# Patient Record
Sex: MALE | Race: WHITE | Employment: UNEMPLOYED | URBAN - METROPOLITAN AREA
[De-identification: names, ages, dates, MRNs, and addresses within clinical notes are randomized per-mention and may not be internally consistent; named-entity substitution may affect disease eponyms.]

---

## 2017-01-03 ENCOUNTER — ANESTHESIA EVENT (OUTPATIENT)
Dept: SURGERY | Facility: CLINIC | Age: 67
End: 2017-01-03
Payer: MEDICARE

## 2017-01-03 ENCOUNTER — APPOINTMENT (OUTPATIENT)
Dept: GENERAL RADIOLOGY | Facility: CLINIC | Age: 67
End: 2017-01-03
Attending: UROLOGY
Payer: MEDICARE

## 2017-01-03 ENCOUNTER — ANESTHESIA (OUTPATIENT)
Dept: SURGERY | Facility: CLINIC | Age: 67
End: 2017-01-03
Payer: MEDICARE

## 2017-01-03 PROCEDURE — 25800025 ZZH RX 258: Performed by: NURSE ANESTHETIST, CERTIFIED REGISTERED

## 2017-01-03 PROCEDURE — 25000125 ZZHC RX 250: Performed by: NURSE ANESTHETIST, CERTIFIED REGISTERED

## 2017-01-03 PROCEDURE — 25000125 ZZHC RX 250: Performed by: UROLOGY

## 2017-01-03 PROCEDURE — 74010 XR KUB: CPT | Mod: 52

## 2017-01-03 RX ORDER — PROPOFOL 10 MG/ML
INJECTION, EMULSION INTRAVENOUS CONTINUOUS PRN
Status: DISCONTINUED | OUTPATIENT
Start: 2017-01-03 | End: 2017-01-03

## 2017-01-03 RX ORDER — LIDOCAINE HYDROCHLORIDE 20 MG/ML
INJECTION, SOLUTION INFILTRATION; PERINEURAL PRN
Status: DISCONTINUED | OUTPATIENT
Start: 2017-01-03 | End: 2017-01-03

## 2017-01-03 RX ORDER — FENTANYL CITRATE 50 UG/ML
INJECTION, SOLUTION INTRAMUSCULAR; INTRAVENOUS PRN
Status: DISCONTINUED | OUTPATIENT
Start: 2017-01-03 | End: 2017-01-03

## 2017-01-03 RX ORDER — DEXAMETHASONE SODIUM PHOSPHATE 4 MG/ML
INJECTION, SOLUTION INTRA-ARTICULAR; INTRALESIONAL; INTRAMUSCULAR; INTRAVENOUS; SOFT TISSUE PRN
Status: DISCONTINUED | OUTPATIENT
Start: 2017-01-03 | End: 2017-01-03

## 2017-01-03 RX ORDER — PROPOFOL 10 MG/ML
INJECTION, EMULSION INTRAVENOUS PRN
Status: DISCONTINUED | OUTPATIENT
Start: 2017-01-03 | End: 2017-01-03

## 2017-01-03 RX ORDER — SODIUM CHLORIDE, SODIUM LACTATE, POTASSIUM CHLORIDE, CALCIUM CHLORIDE 600; 310; 30; 20 MG/100ML; MG/100ML; MG/100ML; MG/100ML
INJECTION, SOLUTION INTRAVENOUS CONTINUOUS PRN
Status: DISCONTINUED | OUTPATIENT
Start: 2017-01-03 | End: 2017-01-03

## 2017-01-03 RX ORDER — ONDANSETRON 2 MG/ML
INJECTION INTRAMUSCULAR; INTRAVENOUS PRN
Status: DISCONTINUED | OUTPATIENT
Start: 2017-01-03 | End: 2017-01-03

## 2017-01-03 RX ADMIN — Medication 3 G: at 12:59

## 2017-01-03 RX ADMIN — PROPOFOL 200 MG: 10 INJECTION, EMULSION INTRAVENOUS at 12:55

## 2017-01-03 RX ADMIN — MIDAZOLAM HYDROCHLORIDE 2 MG: 1 INJECTION, SOLUTION INTRAMUSCULAR; INTRAVENOUS at 12:50

## 2017-01-03 RX ADMIN — ONDANSETRON 4 MG: 2 INJECTION INTRAMUSCULAR; INTRAVENOUS at 12:59

## 2017-01-03 RX ADMIN — FENTANYL CITRATE 25 MCG: 50 INJECTION, SOLUTION INTRAMUSCULAR; INTRAVENOUS at 14:00

## 2017-01-03 RX ADMIN — DEXAMETHASONE SODIUM PHOSPHATE 4 MG: 4 INJECTION, SOLUTION INTRAMUSCULAR; INTRAVENOUS at 12:59

## 2017-01-03 RX ADMIN — SODIUM CHLORIDE, POTASSIUM CHLORIDE, SODIUM LACTATE AND CALCIUM CHLORIDE: 600; 310; 30; 20 INJECTION, SOLUTION INTRAVENOUS at 14:14

## 2017-01-03 RX ADMIN — LIDOCAINE HYDROCHLORIDE 60 MG: 20 INJECTION, SOLUTION INFILTRATION; PERINEURAL at 12:55

## 2017-01-03 RX ADMIN — FENTANYL CITRATE 25 MCG: 50 INJECTION, SOLUTION INTRAMUSCULAR; INTRAVENOUS at 13:55

## 2017-01-03 RX ADMIN — SODIUM CHLORIDE, POTASSIUM CHLORIDE, SODIUM LACTATE AND CALCIUM CHLORIDE: 600; 310; 30; 20 INJECTION, SOLUTION INTRAVENOUS at 12:50

## 2017-01-03 RX ADMIN — PHENYLEPHRINE HYDROCHLORIDE 100 MCG: 10 INJECTION, SOLUTION INTRAMUSCULAR; INTRAVENOUS; SUBCUTANEOUS at 13:16

## 2017-01-03 RX ADMIN — FENTANYL CITRATE 50 MCG: 50 INJECTION, SOLUTION INTRAMUSCULAR; INTRAVENOUS at 12:50

## 2017-01-03 RX ADMIN — PROPOFOL 200 MCG/KG/MIN: 10 INJECTION, EMULSION INTRAVENOUS at 12:55

## 2017-01-03 ASSESSMENT — ENCOUNTER SYMPTOMS: SEIZURES: 1

## 2017-01-03 NOTE — ANESTHESIA CARE TRANSFER NOTE
Patient: Jimmy Walter    COMBINED CYSTOSCOPY, URETEROSCOPY, LASER HOLMIUM LITHOTRIPSY URETER(S), INSERT STENT (Right Urethra)  COMBINED CYSTOSCOPY, RETROGRADES, EXCHANGE STENT URETER(S) (Right Urethra)  EXTRACORPOREAL SHOCK WAVE LITHOTRIPSY (ESWL) (Right Kidney)  Additional InformationProcedure(s):  ;CYSTOSCOPY RIGHT URETEROSCOPY, HOLMIUM LASER, RIGHT EXTRACORPOREAL SHOCK WAVE LITHOTRIPSY (ESWL) ,RIGHT STENT EXCHANGE - Wound Class: II-Clean Contaminated   - Wound Class: I-Clean   - Wound Class: II-Clean Contaminated    Diagnosis: RIGHT STONE KIDNEY AND URETERAL  Diagnosis Additional Information: No value filed.    Anesthesia Type:   General, LMA     Note:  Airway :Face Mask  Patient transferred to:PACU  Comments: Spontaneous respirations, airway patent, LMA removed atraumatically. Oxygen via face mask at 8   LPM to PACU, connected to wall O2 in PACU. All monitors and alarms on and functioning. Report given to PACU RN and questions answered.       Vitals: (Last set prior to Anesthesia Care Transfer)              Electronically Signed By: MELE Lopes CRNA  January 3, 2017  2:53 PM

## 2017-01-03 NOTE — ANESTHESIA PREPROCEDURE EVALUATION
Anesthesia Evaluation     . Pt has had prior anesthetic. Type: General    No history of anesthetic complications     ROS/MED HX    ENT/Pulmonary:     (+)sleep apnea, uses CPAP , . .    Neurologic:     (+)seizures     Cardiovascular:  - neg cardiovascular ROS       METS/Exercise Tolerance:     Hematologic:         Musculoskeletal:         GI/Hepatic:  - neg GI/hepatic ROS       Renal/Genitourinary:     (+) Nephrolithiasis ,       Endo:     (+) Obesity, .      Psychiatric:         Infectious Disease:         Malignancy:   (+) Malignancy           Other:               Physical Exam  Normal systems: cardiovascular, pulmonary and dental    Airway   Mallampati: II  TM distance: >3 FB  Neck ROM: full    Dental     Cardiovascular   Rhythm and rate: regular and normal      Pulmonary    breath sounds clear to auscultation                    Anesthesia Plan      History & Physical Review  History and physical reviewed and following examination; no interval change.    ASA Status:  3 .    NPO Status:  > 8 hours    Plan for General and LMA with Propofol induction. Maintenance will be TIVA.    PONV prophylaxis:  Ondansetron (or other 5HT-3) and Dexamethasone or Solumedrol       Postoperative Care  Postoperative pain management:  IV analgesics and Oral pain medications.      Consents  Anesthetic plan, risks, benefits and alternatives discussed with:  Patient..                          .

## 2017-01-03 NOTE — ANESTHESIA POSTPROCEDURE EVALUATION
Patient: Jimmy Walter    COMBINED CYSTOSCOPY, URETEROSCOPY, LASER HOLMIUM LITHOTRIPSY URETER(S), INSERT STENT (Right Urethra)  COMBINED CYSTOSCOPY, RETROGRADES, EXCHANGE STENT URETER(S) (Right Urethra)  EXTRACORPOREAL SHOCK WAVE LITHOTRIPSY (ESWL) (Right Kidney)  Additional InformationProcedure(s):  ;CYSTOSCOPY RIGHT URETEROSCOPY, HOLMIUM LASER, RIGHT EXTRACORPOREAL SHOCK WAVE LITHOTRIPSY (ESWL) ,RIGHT STENT EXCHANGE - Wound Class: II-Clean Contaminated   - Wound Class: I-Clean   - Wound Class: II-Clean Contaminated    Diagnosis:RIGHT STONE KIDNEY AND URETERAL  Diagnosis Additional Information: No value filed.    Anesthesia Type:  General, LMA    Note:  Anesthesia Post Evaluation    Patient location during evaluation: PACU  Patient participation: Able to fully participate in evaluation  Level of consciousness: awake  Pain management: adequate  Airway patency: patent  Cardiovascular status: acceptable  Respiratory status: acceptable  Hydration status: acceptable  PONV: none     Anesthetic complications: None          Last vitals:  Filed Vitals:    01/03/17 1201 01/03/17 1452   BP: 129/73 144/84   Pulse:  87   Temp: 36.6  C (97.9  F) 36.9  C (98.4  F)   Resp: 20 12   SpO2: 96% 95%       Electronically Signed By: Milan Jolly MD  January 3, 2017  3:01 PM

## 2017-01-09 ASSESSMENT — ENCOUNTER SYMPTOMS
JAUNDICE: 0
RECTAL PAIN: 0
ABDOMINAL PAIN: 0
NAUSEA: 0
HEMATURIA: 0
BLOOD IN STOOL: 0
DIFFICULTY URINATING: 1
RECTAL BLEEDING: 0
FLANK PAIN: 1
BOWEL INCONTINENCE: 0
DIARRHEA: 0
CONSTIPATION: 0
VOMITING: 0
DYSURIA: 1
BLOATING: 0
HEARTBURN: 0

## 2017-01-11 DIAGNOSIS — N20.0 KIDNEY STONE: Primary | ICD-10-CM

## 2017-01-12 ENCOUNTER — OFFICE VISIT (OUTPATIENT)
Dept: UROLOGY | Facility: CLINIC | Age: 67
End: 2017-01-12
Payer: MEDICARE

## 2017-01-12 ENCOUNTER — HOSPITAL ENCOUNTER (OUTPATIENT)
Dept: GENERAL RADIOLOGY | Facility: CLINIC | Age: 67
Discharge: HOME | End: 2017-01-12
Attending: UROLOGY | Admitting: UROLOGY
Payer: MEDICARE

## 2017-01-12 VITALS — BODY MASS INDEX: 37.8 KG/M2 | HEIGHT: 71 IN | WEIGHT: 270 LBS

## 2017-01-12 DIAGNOSIS — N40.0 ENLARGED PROSTATE: Primary | ICD-10-CM

## 2017-01-12 DIAGNOSIS — N20.0 KIDNEY STONE: ICD-10-CM

## 2017-01-12 DIAGNOSIS — K80.50 STONES COMMON DUCT: ICD-10-CM

## 2017-01-12 LAB
ALBUMIN UR-MCNC: 30 MG/DL
APPEARANCE UR: CLEAR
BILIRUB UR QL STRIP: NEGATIVE
COLOR UR AUTO: YELLOW
GLUCOSE UR STRIP-MCNC: NEGATIVE MG/DL
HGB UR QL STRIP: ABNORMAL
KETONES UR STRIP-MCNC: NEGATIVE MG/DL
LEUKOCYTE ESTERASE UR QL STRIP: ABNORMAL
NITRATE UR QL: NEGATIVE
PH UR STRIP: 5.5 PH (ref 5–7)
SP GR UR STRIP: 1.02 (ref 1–1.03)
URN SPEC COLLECT METH UR: ABNORMAL
UROBILINOGEN UR STRIP-ACNC: 0.2 EU/DL (ref 0.2–1)

## 2017-01-12 PROCEDURE — 99024 POSTOP FOLLOW-UP VISIT: CPT | Performed by: UROLOGY

## 2017-01-12 PROCEDURE — 52310 CYSTOSCOPY AND TREATMENT: CPT | Performed by: UROLOGY

## 2017-01-12 PROCEDURE — 74010 XR KUB: CPT | Mod: 52

## 2017-01-12 PROCEDURE — 81003 URINALYSIS AUTO W/O SCOPE: CPT | Performed by: UROLOGY

## 2017-01-12 RX ORDER — CIPROFLOXACIN 500 MG/1
500 TABLET, FILM COATED ORAL ONCE
Qty: 1 TABLET | Refills: 0 | Status: SHIPPED | OUTPATIENT
Start: 2017-01-12 | End: 2017-01-12

## 2017-01-12 ASSESSMENT — PAIN SCALES - GENERAL: PAINLEVEL: NO PAIN (0)

## 2017-01-12 NOTE — PROGRESS NOTES
Office Visit Note  [facility]  [phone]    UROLOGIC DIAGNOSES:   Right kidney stone and right ureteral stone, enlarged prostate    CURRENT INTERVENTIONS:   S/P ESWL of kidney stone and ureteroscopy for ureteral stone    HISTORY:   Jimmy returns to clinic today for postoperative visit.  He reports feeling well since his procedure. His stones were composed of calcium oxalate monohydrate. During his procedures he was also found to have a very enlarged and obstructing median lobe of the prostate. He reports no baseline urinary complaints prior to having problems with his stones.  He is here today for stent removal.      PAST MEDICAL HISTORY:   Past Medical History   Diagnosis Date     Allergy      Seizures (H)      Cancer (H)      Sleep apnea      CPAP       PAST SURGICAL HISTORY:   Past Surgical History   Procedure Laterality Date     Mri craniotomy with optical tracking system  7/10/2013     Procedure: MRI CRANIOTOMY WITH OPTICAL TRACKING SYSTEM;  MRI 3 Stealth Assisted Right Craniotomy And Tumor Resection ;  Surgeon: Danilo Antonio MD;  [facility]     Orthopedic surgery       toe surgery     Orthopedic surgery Left      ORIF Fibula FX     Combined cystoscopy, retrogrades, ureteroscopy, insert stent Right 12/17/2016     Procedure: COMBINED CYSTOSCOPY, RETROGRADES, URETEROSCOPY, INSERT STENT;  Surgeon: Milan Leary MD;  Location:  OR     Laser holmium lithotripsy ureter(s), insert stent, combined Right 1/3/2017     Procedure: COMBINED CYSTOSCOPY, URETEROSCOPY, LASER HOLMIUM LITHOTRIPSY URETER(S), INSERT STENT;  Surgeon: Milan Leary MD;  Location:  OR     Extracorporeal shock wave lithotripsy (eswl) Right 1/3/2017     Procedure: EXTRACORPOREAL SHOCK WAVE LITHOTRIPSY (ESWL);  Surgeon: Milan Leary MD;  Location:  OR     Combined cystoscopy, retrogrades, exchange stent ureter(s) Right 1/3/2017     Procedure: COMBINED CYSTOSCOPY, RETROGRADES, EXCHANGE  STENT URETER(S);  Surgeon: Milan Leary MD;  Location:  OR       FAMILY HISTORY: No family history on file.    SOCIAL HISTORY:   Social History   Substance Use Topics     Smoking status: Never Smoker      Smokeless tobacco: Not on file     Alcohol Use: Yes      Comment: one per week at most       Current Outpatient Prescriptions   Medication     oxyCODONE (ROXICODONE) 5 MG IR tablet     tolterodine (DETROL LA) 4 MG 24 hr capsule     ondansetron (ZOFRAN ODT) 4 MG ODT tab     loratadine (CLARITIN) 10 MG tablet     traZODone (DESYREL) 50 MG tablet     levETIRAcetam (KEPPRA) 750 MG tablet     MELATONIN PO     magnesium 250 MG tablet     DiphenhydrAMINE HCl (BENADRYL ALLERGY PO)     Ascorbic Acid (VITAMIN C ER PO)     omeprazole 20 MG tablet     fluticasone (FLONASE) 50 MCG/ACT nasal spray     multivitamin, therapeutic with minerals (THERA-VIT-M) TABS     Omega-3 Fatty Acids (OMEGA-3 FISH OIL PO)     calcium carbonate (OS-GABRIEL 500 MG [place]. [place]) 500 MG tablet     No current facility-administered medications for this visit.         PHYSICAL EXAM:    There were no vitals taken for this visit.    HEENT: Normocephalic and atraumatic   Cardiac: Not done  Back/Flank: Not done  CNS/PNS: Not done  Respiratory: Normal non-labored breathing  Abdomen: Soft nontender and nondistended  Peripheral Vascular: Not done  Mental Status: Not done    Penis: Not done  Scrotal Skin: Not done  Testicles: Not done  Epididymis: Not done  Digital Rectal Exam: not repeated    Cystoscopy: I performed flexible cystoscopy todayand removed the stent without difficulty.    Imaging: I reviewed his KUB images from today. Stent in good position.  No stones present.    Urinalysis: UA RESULTS:  Recent Labs   Lab Test  12/14/16   2325   COLOR  Yellow   APPEARANCE  Slightly Cloudy   URINEGLC  Negative   URINEBILI  Negative   URINEKETONE  5*   SG  1.021   UBLD  Moderate*   URINEPH  5.5   PROTEIN  10*   NITRITE  Negative   LEUKEST  Negative   RBCU   121*   WBCU  5*       PSA: he reports having a normal PSA with his endocrinologist    Post Void Residual:     Other labs: None today      IMPRESSION:  Doing well, stent removed, enlarged prostate    PLAN:  We discussed prevention methods for future stones. I will have him come back in 1 year for another KUBto rule outthe formation of any additional stones. In the meantime, I would like to have him come back to clinic  For further evaluation of the prostate.  I would like to give him a month to recover from his stone procedures but then have him come back in 1 month to check a urinalysis and bladder scan.  We will also get his PSA information from his endocrinologist's office.  I will see him back next month.    Total Time: 15 minutes                                      Total in Consultation: 10 minutes      Milan Leary M.D.

## 2017-01-12 NOTE — Clinical Note
1/12/2017       RE: Jimmy Walter  [address] [facility] [address]     Dear Colleague,    Thank you for referring your patient, Jimmy Walter, to the [facility] [place] [facility]. Please see a copy of my visit note below.    Office Visit Note  [facility]  [phone]    UROLOGIC DIAGNOSES:   Right kidney stone and right ureteral stone, enlarged prostate    CURRENT INTERVENTIONS:   S/P ESWL of kidney stone and ureteroscopy for ureteral stone    HISTORY:   Jimmy returns to clinic today for postoperative visit.  He reports feeling well since his procedure. His stones were composed of calcium oxalate monohydrate. During his procedures he was also found to have a very enlarged and obstructing median lobe of the prostate. He reports no baseline urinary complaints prior to having problems with his stones.  He is here today for stent removal.      PAST MEDICAL HISTORY:   Past Medical History   Diagnosis Date     Allergy      Seizures (H)      Cancer (H)      Sleep apnea      CPAP       PAST SURGICAL HISTORY:   Past Surgical History   Procedure Laterality Date     Mri craniotomy with optical tracking system  7/10/2013     Procedure: MRI CRANIOTOMY WITH OPTICAL TRACKING SYSTEM;  MRI 3 Stealth Assisted Right Craniotomy And Tumor Resection ;  Surgeon: Danilo Antonio MD;  [facility]     Orthopedic surgery       toe surgery     Orthopedic surgery Left      ORIF Fibula FX     Combined cystoscopy, retrogrades, ureteroscopy, insert stent Right 12/17/2016     Procedure: COMBINED CYSTOSCOPY, RETROGRADES, URETEROSCOPY, INSERT STENT;  Surgeon: Milan Leary MD;  Location:  OR     Laser holmium lithotripsy ureter(s), insert stent, combined Right 1/3/2017     Procedure: COMBINED CYSTOSCOPY, URETEROSCOPY, LASER HOLMIUM LITHOTRIPSY URETER(S), INSERT STENT;  Surgeon: Milan Leary MD;  Location:   OR     Extracorporeal shock wave lithotripsy (eswl) Right 1/3/2017     Procedure: EXTRACORPOREAL SHOCK WAVE LITHOTRIPSY (ESWL);  Surgeon: Milan Leary MD;  Location: [facility] OR     Combined cystoscopy, retrogrades, exchange stent ureter(s) Right 1/3/2017     Procedure: COMBINED CYSTOSCOPY, RETROGRADES, EXCHANGE STENT URETER(S);  Surgeon: Milan Leary MD;  Location:  OR       FAMILY HISTORY: No family history on file.    SOCIAL HISTORY:   Social History   Substance Use Topics     Smoking status: Never Smoker      Smokeless tobacco: Not on file     Alcohol Use: Yes      Comment: one per week at most       Current Outpatient Prescriptions   Medication     oxyCODONE (ROXICODONE) 5 MG IR tablet     tolterodine (DETROL LA) 4 MG 24 hr capsule     ondansetron (ZOFRAN ODT) 4 MG ODT tab     loratadine (CLARITIN) 10 MG tablet     traZODone (DESYREL) 50 MG tablet     levETIRAcetam (KEPPRA) 750 MG tablet     MELATONIN PO     magnesium 250 MG tablet     DiphenhydrAMINE HCl (BENADRYL ALLERGY PO)     Ascorbic Acid (VITAMIN C ER PO)     omeprazole 20 MG tablet     fluticasone (FLONASE) 50 MCG/ACT nasal spray     multivitamin, therapeutic with minerals (THERA-VIT-M) TABS     Omega-3 Fatty Acids (OMEGA-3 FISH OIL PO)     calcium carbonate (OS-GABRIEL 500 MG [place]. [place]) 500 MG tablet     No current facility-administered medications for this visit.         PHYSICAL EXAM:    There were no vitals taken for this visit.    HEENT: Normocephalic and atraumatic   Cardiac: Not done  Back/Flank: Not done  CNS/PNS: Not done  Respiratory: Normal non-labored breathing  Abdomen: Soft nontender and nondistended  Peripheral Vascular: Not done  Mental Status: Not done    Penis: Not done  Scrotal Skin: Not done  Testicles: Not done  Epididymis: Not done  Digital Rectal Exam: not repeated    Cystoscopy: I performed flexible cystoscopy todayand removed the stent without difficulty.    Imaging: I reviewed his KUB images from today. Stent in  good position.  No stones present.    Urinalysis: UA RESULTS:  Recent Labs   Lab Test  12/14/16   2325   COLOR  Yellow   APPEARANCE  Slightly Cloudy   URINEGLC  Negative   URINEBILI  Negative   URINEKETONE  5*   SG  1.021   UBLD  Moderate*   URINEPH  5.5   PROTEIN  10*   NITRITE  Negative   LEUKEST  Negative   RBCU  121*   WBCU  5*       PSA: he reports having a normal PSA with his endocrinologist    Post Void Residual:     Other labs: None today      IMPRESSION:  Doing well, stent removed, enlarged prostate    PLAN:  We discussed prevention methods for future stones. I will have him come back in 1 year for another KUBto rule outthe formation of any additional stones. In the meantime, I would like to have him come back to clinic  For further evaluation of the prostate.  I would like to give him a month to recover from his stone procedures but then have him come back in 1 month to check a urinalysis and bladder scan.  We will also get his PSA information from his endocrinologist's office.  I will see him back next month.    Total Time: 15 minutes                                      Total in Consultation: 10 minutes      Milan Leary M.D.

## 2017-01-12 NOTE — MR AVS SNAPSHOT
"              After Visit Summary   1/12/2017    Jimmy Walter    MRN: [id number]           Patient Information     Date Of Birth          1950        Visit Information        Provider Department      1/12/2017 10:00 AM Milan Leary MD; [facility] [place]        Today's Diagnoses     Kidney stone           Care Instructions         AFTER YOUR CYSTOSCOPY         You have just completed a cystoscopy, or \"cysto\", which allowed your physician to learn more about your bladder (or to remove a stent placed after surgery). We suggest that you continue to avoid caffeine, fruit juice, and alcohol for the next 24 hours, however, you are encouraged to return to your normal activities.       A few things that are considered normal after your cystoscopy:    * small amount of bleeding (or spotting) that clears within the next 24 hours    * slight burning sensation with urination    * sensation to of needing to avoid more frequently    * the feeling of \"air\" in your urine    * mild discomfort that is relieved with Tylonol        Please contact our office promptly if you:    * develop a fever above 101 degrees    * are unable to urinate    * develop bright red blood that does not stop    * severe pain or swelling        And of course, please contact our office with any concerns or questions [phone]            Follow-ups after your visit        Follow-up notes from your care team     Return in about 1 month (around 2/12/2017) for UA and bladder scan, Also need PSA info from his endocrinologist.      Your next 10 appointments already scheduled     Jan 16, 2017  9:00 AM   MR BRAIN W/O & W CONTRAST with [id number]   [facility] ([facility])    [id number] [place] [address] [phone]           Take your medicines as usual, unless your doctor tells you not to. Bring a list of your current medicines to " your exam (including vitamins, minerals and over-the-counter drugs).  You will be given intravenous contrast for this exam. To prepare:   The day before your exam, drink extra fluids at least six 8-ounce glasses (unless your doctor tells you to restrict your fluids).   Have a blood test (creatinine test) within 30 days of your exam. Go to your clinic or Diagnostic Imaging Department for this test.  The MRI machine uses a strong magnet. Please wear clothes without metal (snaps, zippers). A sweatsuit works well, or we may give you a hospital gown.  Please remove any body piercings and hair extensions before you arrive. You will also remove watches, jewelry, hairpins, wallets, dentures, partial dental plates and hearing aids. You may wear contact lenses, and you may be able to wear your rings. We have a safe place to keep your personal items, but it is safer to leave them at home.   **IMPORTANT** THE INSTRUCTIONS BELOW ARE ONLY FOR THOSE PATIENTS WHO HAVE BEEN TOLD THEY WILL RECEIVE SEDATION OR GENERAL ANESTHESIA DURING THEIR MRI PROCEDURE:  IF YOU WILL RECEIVE SEDATION (take medicine to help you relax during your exam):   You must get the medicine from your doctor before you arrive. Bring the medicine to the exam. Do not take it at home.   Arrive one hour early. Bring someone who can take you home after the test. Your medicine will make you sleepy. After the exam, you may not drive, take a bus or take a taxi by yourself.   No eating 8 hours before your exam. You may have clear liquids up until 4 hours before your exam. (Clear liquids include water, clear tea, black coffee and fruit juice without pulp.)  IF YOU WILL RECEIVE ANESTHESIA (be asleep for your exam):   Arrive 1 1/2 hours early. Bring someone who can take you home after the test. You may not drive, take a bus or take a taxi by yourself.   No eating 8 hours before your exam. You may have clear liquids up until 4 hours before your exam. (Clear liquids include  water, clear tea, black coffee and fruit juice without pulp.)  Please call the Imaging Department at your exam site with any questions.            Jan 16, 2017  3:00 PM   (Arrive by 2:45 PM)   Return Visit with Danilo Antonio MD   [facility] ([facility] [place])    [address]  2nd Floor  [place] [address]   [phone]            Feb 09, 2017 11:00 AM   Return Visit with Milan Leary MD   [facility] [place] ([facility])    [address]  [address]  [facility] [address] [phone]              Who to contact     If you have questions or need follow up information about today's clinic visit or your schedule please contact [facility] [place] directly at [phone].  Normal or non-critical lab and imaging results will be communicated to you by MyChart, letter or phone within 4 business days after the clinic has received the results. If you do not hear from us within 7 days, please contact the clinic through xiao qu wu youhart or phone. If you have a critical or abnormal lab result, we will notify you by phone as soon as possible.  Submit refill requests through [employer] or call your pharmacy and they will forward the refill request to us. Please allow 3 business days for your refill to be completed.          Additional Information About Your Visit        MyChart Information     [employer] gives you secure access to your electronic health record. If you see a primary care provider, you can also send messages to your care team and make appointments. If you have questions, please call your primary care clinic.  If you do not have a primary care provider, please call [phone] and they will assist you.        Care EveryWhere ID     This is your Care EveryWhere ID. This could be used by other organizations to access your [place] medical records  [id number]        Your Vitals  "Were     Height BMI (Body Mass Index)                1.803 m (5' 11\") 37.67 kg/m2           Blood Pressure from Last 3 Encounters:   01/03/17 151/84   12/17/16 153/98   12/15/16 112/70    Weight from Last 3 Encounters:   01/12/17 122.471 kg (270 lb)   01/03/17 121.02 kg (266 lb 12.8 oz)   12/15/16 122.471 kg (270 lb)              We Performed the Following     UA without Microscopic          Today's Medication Changes          These changes are accurate as of: 1/12/17 10:39 AM.  If you have any questions, ask your nurse or doctor.               Start taking these medicines.        Dose/Directions    ciprofloxacin 500 MG tablet   Commonly known as:  CIPRO   Used for:  Kidney stone   Started by:  Milan Leary MD        Dose:  500 mg   Take 1 tablet (500 mg) by mouth once for 1 dose   Quantity:  1 tablet   Refills:  0            Where to get your medicines      These medications were sent to [facility] [id number] [facility] [address]  [address]  [address], [facility] [address]     Phone:  [phone]    - ciprofloxacin 500 MG tablet             Primary Care Provider Office Phone # Fax #    Kveyn GENEVIEVE Treviño [phone]       [facility] [address] [place] [address]        Thank you!     Thank you for choosing [facility] [place]  for your care. Our goal is always to provide you with excellent care. Hearing back from our patients is one way we can continue to improve our services. Please take a few minutes to complete the written survey that you may receive in the mail after your visit with us. Thank you!             Your Updated Medication List - Protect others around you: Learn how to safely use, store and throw away your medicines at www.disposemymeds.org.          This list is accurate as of: 1/12/17 10:39 AM.  Always use your most recent med list.                   Brand Name Dispense " Instructions for use    BENADRYL ALLERGY PO      Take 25 mg by mouth daily       calcium carbonate 500 MG tablet    OS-GABRIEL 500 mg [place]. Ca     Take 500 mg by mouth daily       ciprofloxacin 500 MG tablet    CIPRO    1 tablet    Take 1 tablet (500 mg) by mouth once for 1 dose       fluticasone 50 MCG/ACT spray    FLONASE     Spray 1 spray into both nostrils daily.       levETIRAcetam 750 MG tablet    KEPPRA    60 tablet    Take 1 tablet (750 mg) by mouth 2 times daily       loratadine 10 MG tablet    CLARITIN     Take 10 mg by mouth daily       magnesium 250 MG tablet      Take 1 tablet by mouth daily       MELATONIN PO      Take 5 mg by mouth At Bedtime       multivitamin, therapeutic with minerals Tabs tablet      Take 1 tablet by mouth daily.       OMEGA-3 FISH OIL PO      Take 1 g by mouth daily       omeprazole 20 MG tablet     30 tablet    Take 1 tablet by mouth daily. Take 30-60 minutes before a meal.       ondansetron 4 MG ODT tab    ZOFRAN ODT    10 tablet    Take 1 tablet (4 mg) by mouth every 8 hours as needed for nausea       oxyCODONE 5 MG IR tablet    ROXICODONE    10 tablet    1-2 tabs po q6hrs prn pain       tolterodine 4 MG 24 hr capsule    DETROL LA    10 capsule    Take 1 capsule (4 mg) by mouth daily as needed       traZODone 50 MG tablet    DESYREL    60 tablet    Take 1 tablet (50 mg) by mouth nightly as needed for sleep       VITAMIN C ER PO      Take 600 mg by mouth daily

## 2017-01-12 NOTE — NURSING NOTE
Chief Complaint   Patient presents with     Cystoscopy     stent out        Bridget Campa LPN 10:05 AM January 12, 2017    Prior to the start of the procedure and with procedural staff participation, I verbally confirmed the patient s identity using two indicators, relevant allergies, that the procedure was appropriate and matched the consent or emergent situation, and that the correct equipment/implants were available. Immediately prior to starting the procedure I conducted the Time Out with the procedural staff and re-confirmed the patient s name, procedure, and site/side. (The Joint Commission universal protocol was followed.)  Yes    Sedation (Moderate or Deep): None    Bridget Campa LPN 10:05 AM January 12, 2017

## 2017-01-12 NOTE — PATIENT INSTRUCTIONS
"     AFTER YOUR CYSTOSCOPY         You have just completed a cystoscopy, or \"cysto\", which allowed your physician to learn more about your bladder (or to remove a stent placed after surgery). We suggest that you continue to avoid caffeine, fruit juice, and alcohol for the next 24 hours, however, you are encouraged to return to your normal activities.       A few things that are considered normal after your cystoscopy:    * small amount of bleeding (or spotting) that clears within the next 24 hours    * slight burning sensation with urination    * sensation to of needing to avoid more frequently    * the feeling of \"air\" in your urine    * mild discomfort that is relieved with Tylonol        Please contact our office promptly if you:    * develop a fever above 101 degrees    * are unable to urinate    * develop bright red blood that does not stop    * severe pain or swelling        And of course, please contact our office with any concerns or questions [phone]      "

## 2017-01-16 ENCOUNTER — OFFICE VISIT (OUTPATIENT)
Dept: NEUROSURGERY | Facility: CLINIC | Age: 67
End: 2017-01-16
Attending: NEUROLOGICAL SURGERY
Payer: MEDICARE

## 2017-01-16 ENCOUNTER — HOSPITAL ENCOUNTER (OUTPATIENT)
Dept: MRI IMAGING | Facility: CLINIC | Age: 67
Discharge: HOME | End: 2017-01-16
Attending: NEUROLOGICAL SURGERY | Admitting: NEUROLOGICAL SURGERY
Payer: MEDICARE

## 2017-01-16 VITALS
BODY MASS INDEX: 37.86 KG/M2 | RESPIRATION RATE: 16 BRPM | DIASTOLIC BLOOD PRESSURE: 86 MMHG | TEMPERATURE: 97.6 F | OXYGEN SATURATION: 96 % | HEART RATE: 68 BPM | WEIGHT: 271.3 LBS | SYSTOLIC BLOOD PRESSURE: 143 MMHG

## 2017-01-16 DIAGNOSIS — C71.9 OLIGODENDROGLIOMA, ANAPLASTIC (H): Primary | ICD-10-CM

## 2017-01-16 DIAGNOSIS — C71.9 OLIGODENDROGLIOMA, ANAPLASTIC (H): ICD-10-CM

## 2017-01-16 PROCEDURE — 99212 OFFICE O/P EST SF 10 MIN: CPT | Mod: ZF

## 2017-01-16 PROCEDURE — 70553 MRI BRAIN STEM W/O & W/DYE: CPT

## 2017-01-16 PROCEDURE — A9585 GADOBUTROL INJECTION: HCPCS | Performed by: NEUROLOGICAL SURGERY

## 2017-01-16 PROCEDURE — 25500045 ZZH RX 255: Performed by: NEUROLOGICAL SURGERY

## 2017-01-16 RX ORDER — GADOBUTROL 604.72 MG/ML
12 INJECTION INTRAVENOUS ONCE
Status: COMPLETED | OUTPATIENT
Start: 2017-01-16 | End: 2017-01-16

## 2017-01-16 RX ADMIN — GADOBUTROL 12 ML: 604.72 INJECTION INTRAVENOUS at 09:24

## 2017-01-16 NOTE — MR AVS SNAPSHOT
After Visit Summary   1/16/2017    Jimmy Walter    MRN: [id number]           Patient Information     Date Of Birth          1950        Visit Information        Provider Department      1/16/2017 3:00 PM Danilo Antonio MD [facility]        Today's Diagnoses     Oligodendroglioma, anaplastic (H)    -  1       Care Instructions    5/12/17 @ 8:45a check in [facility] [address] drink extra fluids night before    5/19/17 @ 10:30 Dr. Lobato 2nd floor [facility]        Follow-ups after your visit        Follow-up notes from your care team     Return in about 4 months (around 5/16/2017) for Imaging.      Your next 10 appointments already scheduled     Feb 09, 2017 11:00 AM   Return Visit with Milan Leary MD   [facility] ([facility])    [address]  [address]  [place] [id number] [phone]            May 12, 2017  9:00 AM   MR BRAIN W/O & W CONTRAST with [id number]   [facility] ([facility])    [address]  [place] [address] [phone]           Take your medicines as usual, unless your doctor tells you not to. Bring a list of your current medicines to your exam (including vitamins, minerals and over-the-counter drugs).  You will be given intravenous contrast for this exam. To prepare:   The day before your exam, drink extra fluids at least six 8-ounce glasses (unless your doctor tells you to restrict your fluids).   Have a blood test (creatinine test) within 30 days of your exam. Go to your clinic or Diagnostic Imaging Department for this test.  The MRI machine uses a strong magnet. Please wear clothes without metal (snaps, zippers). A sweatsuit works well, or we may give you a hospital gown.  Please remove any body piercings and hair extensions before you arrive. You will also remove watches, jewelry, hairpins, wallets, dentures,  partial dental plates and hearing aids. You may wear contact lenses, and you may be able to wear your rings. We have a safe place to keep your personal items, but it is safer to leave them at home.   **IMPORTANT** THE INSTRUCTIONS BELOW ARE ONLY FOR THOSE PATIENTS WHO HAVE BEEN TOLD THEY WILL RECEIVE SEDATION OR GENERAL ANESTHESIA DURING THEIR MRI PROCEDURE:  IF YOU WILL RECEIVE SEDATION (take medicine to help you relax during your exam):   You must get the medicine from your doctor before you arrive. Bring the medicine to the exam. Do not take it at home.   Arrive one hour early. Bring someone who can take you home after the test. Your medicine will make you sleepy. After the exam, you may not drive, take a bus or take a taxi by yourself.   No eating 8 hours before your exam. You may have clear liquids up until 4 hours before your exam. (Clear liquids include water, clear tea, black coffee and fruit juice without pulp.)  IF YOU WILL RECEIVE ANESTHESIA (be asleep for your exam):   Arrive 1 1/2 hours early. Bring someone who can take you home after the test. You may not drive, take a bus or take a taxi by yourself.   No eating 8 hours before your exam. You may have clear liquids up until 4 hours before your exam. (Clear liquids include water, clear tea, black coffee and fruit juice without pulp.)  Please call the Imaging Department at your exam site with any questions.            May 19, 2017 10:30 AM   (Arrive by 10:15 AM)   Return Visit with Dominga Lobato MD   [facility] ([facility])    [address] [place] [address] [phone]              Future tests that were ordered for you today     Open Future Orders        Priority Expected Expires Ordered    MR Brain w/o & w Contrast Routine 5/18/2017 3/3/2026 1/16/2017            Who to contact     If you have questions or need follow up information about today's clinic visit or your  schedule please contact [facility] directly at [phone].  Normal or non-critical lab and imaging results will be communicated to you by MyChart, letter or phone within 4 business days after the clinic has received the results. If you do not hear from us within 7 days, please contact the clinic through [employer]ate[employer] or phone. If you have a critical or abnormal lab result, we will notify you by phone as soon as possible.  Submit refill requests through Guidecentral or call your pharmacy and they will forward the refill request to us. Please allow 3 business days for your refill to be completed.          Additional Information About Your Visit        Kong[employer]ate[employer]Audioscribe Information     Guidecentral gives you secure access to your electronic health record. If you see a primary care provider, you can also send messages to your care team and make appointments. If you have questions, please call your primary care clinic.  If you do not have a primary care provider, please call [phone] and they will assist you.        Care EveryWhere ID     This is your Care EveryWhere ID. This could be used by other organizations to access your [facility] medical records  [id number]        Your Vitals Were     Pulse Temperature Respirations Pulse Oximetry          68 97.6  F (36.4  C) (Oral) 16 96%         Blood Pressure from Last 3 Encounters:   01/16/17 143/86   01/03/17 151/84   12/17/16 153/98    Weight from Last 3 Encounters:   01/16/17 123.061 kg (271 lb 4.8 oz)   01/12/17 122.471 kg (270 lb)   01/03/17 121.02 kg (266 lb 12.8 oz)               Primary Care Provider Office Phone # Fax #    Kevyn Treviño [phone]       [facility] [address] [place] [address]        Thank you!     Thank you for choosing [facility]  for your care. Our goal is always to provide you with excellent care. Hearing back from our patients is one way we can continue to improve our services. Please take a  few minutes to complete the written survey that you may receive in the mail after your visit with us. Thank you!             Your Updated Medication List - Protect others around you: Learn how to safely use, store and throw away your medicines at www.disposemymeds.org.          This list is accurate as of: 1/16/17  4:11 PM.  Always use your most recent med list.                   Brand Name Dispense Instructions for use    ASPIRIN PO      Take 325 mg by mouth daily       BENADRYL ALLERGY PO      Take 25 mg by mouth daily       calcium carbonate 500 MG tablet    OS-GABRIEL 500 mg [place]. Ca     Take 500 mg by mouth daily       fluticasone 50 MCG/ACT spray    FLONASE     Spray 1 spray into both nostrils daily.       levETIRAcetam 750 MG tablet    KEPPRA    60 tablet    Take 1 tablet (750 mg) by mouth 2 times daily       loratadine 10 MG tablet    CLARITIN     Take 10 mg by mouth daily       magnesium 250 MG tablet      Take 1 tablet by mouth daily       MELATONIN PO      Take 5 mg by mouth At Bedtime       multivitamin, therapeutic with minerals Tabs tablet      Take 1 tablet by mouth daily.       OMEGA-3 FISH OIL PO      Take 1 g by mouth daily       omeprazole 20 MG tablet     30 tablet    Take 1 tablet by mouth daily. Take 30-60 minutes before a meal.       traZODone 50 MG tablet    DESYREL    60 tablet    Take 1 tablet (50 mg) by mouth nightly as needed for sleep       VITAMIN C ER PO      Take 600 mg by mouth daily

## 2017-01-16 NOTE — NURSING NOTE
"Jimmy Walter is a 66 year old male who presents for:  Chief Complaint   Patient presents with     Oncology Clinic Visit     Return: ANAPLASTIC OLIGODENDROGIOMA        Initial Vitals:  /86 mmHg  Pulse 68  Temp(Src) 97.6  F (36.4  C) (Oral)  Resp 16  Wt 123.061 kg (271 lb 4.8 oz)  SpO2 96% Estimated body mass index is 37.86 kg/(m^2) as calculated from the following:    Height as of 1/12/17: 1.803 m (5' 11\").    Weight as of this encounter: 123.061 kg (271 lb 4.8 oz).. There is no height on file to calculate BSA. BP completed using cuff size: regular  Data Unavailable No LMP for male patient. Allergies and medications reviewed.     Medications: Medication refills not needed today.  Pharmacy name entered into EPIC:    [facility] - [place], [place] - [address] [id number] - [place], [place] - [address]  [facility] - [place], [place] - [address]    Comments:     6 minutes for nursing intake (face to face time)   UZAIR BRYANT CMA          "

## 2017-01-16 NOTE — PATIENT INSTRUCTIONS
5/12/17 @ 8:45a check in [facility] [address] drink extra fluids night before    5/19/17 @ 10:30 Dr. Lobato 2nd floor [facility]

## 2017-01-16 NOTE — PROGRESS NOTES
Answers for HPI/ROS submitted by the patient on 1/9/2017   General Symptoms: No  Skin Symptoms: No  HENT Symptoms: No  EYE SYMPTOMS: No  HEART SYMPTOMS: No  LUNG SYMPTOMS: No  INTESTINAL SYMPTOMS: Yes  URINARY SYMPTOMS: Yes  REPRODUCTIVE SYMPTOMS: No  SKELETAL SYMPTOMS: No  BLOOD SYMPTOMS: No  NERVOUS SYSTEM SYMPTOMS: No  MENTAL HEALTH SYMPTOMS: No  Heart burn or indigestion: No  Nausea: No  Vomiting: No  Abdominal pain: No  Bloating: No  Constipation: No  Diarrhea: No  Blood in stool: No  Black stools: No  Rectal or Anal pain: No  Fecal incontinence: No  Rectal bleeding: No  Yellowing of skin or eyes: No  Vomit with blood: No  Change in stools: No  Hemorrhoids: No  Trouble holding urine or incontinence: Yes  Pain or burning: Yes  Trouble starting or stopping: Yes  Increased frequency of urination: Yes  Blood in urine: No  Decreased frequency of urination: No  Frequent nighttime urination: No  Flank pain: Yes  Difficulty emptying bladder: Yes

## 2017-01-16 NOTE — Clinical Note
1/16/2017       RE: Jimmy Walter  [address] [address]     Dear Colleague,    Thank you for referring your patient, Jimmy Walter, to the [facility]. Please see a copy of my visit note below.    Mr. 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  He is status post surgery in 07/2013 with resection, as well as the radiochemotherapy which was completed 09/2014.He has had issues with kidney stones this fall which have finally been resolved. Otherwise doing well.    Filed Vitals:    01/16/17 1459   BP: 143/86   Pulse: 68   Temp: 97.6  F (36.4  C)   TempSrc: Oral   Resp: 16   Weight: 123.061 kg (271 lb 4.8 oz)   SpO2: 96%     Body mass index is 37.86 kg/(m^2).  Data Unavailable    Awake, alert.  Strength 5/5 BUE/LE    MRI shows resection cavity with stable T2 signal around it. The imaging was shown to the patient and reviewed in clinic.     A: Anaplastic oligo with deletions, s/p radiochemotherapy. Stable.    P: RTC 4 months with MRI. Will transition his care to Dr. Lobato.    Again, thank you for allowing me to participate in the care of your patient.      Sincerely,    Danilo Antonio MD

## 2017-01-16 NOTE — PROGRESS NOTES
Mr. 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  He is status post surgery in 07/2013 with resection, as well as the radiochemotherapy which was completed 09/2014.He has had issues with kidney stones this fall which have finally been resolved. Otherwise doing well.    Filed Vitals:    01/16/17 1459   BP: 143/86   Pulse: 68   Temp: 97.6  F (36.4  C)   TempSrc: Oral   Resp: 16   Weight: 123.061 kg (271 lb 4.8 oz)   SpO2: 96%     Body mass index is 37.86 kg/(m^2).  Data Unavailable    Awake, alert.  Strength 5/5 BUE/LE    MRI shows resection cavity with stable T2 signal around it. The imaging was shown to the patient and reviewed in clinic.     A: Anaplastic oligo with deletions, s/p radiochemotherapy. Stable.    P: RTC 4 months with MRI. Will transition his care to Dr. Lobato.  Answers for HPI/ROS submitted by the patient on 1/9/2017   General Symptoms: No  Skin Symptoms: No  HENT Symptoms: No  EYE SYMPTOMS: No  HEART SYMPTOMS: No  LUNG SYMPTOMS: No  INTESTINAL SYMPTOMS: Yes  URINARY SYMPTOMS: Yes  REPRODUCTIVE SYMPTOMS: No  SKELETAL SYMPTOMS: No  BLOOD SYMPTOMS: No  NERVOUS SYSTEM SYMPTOMS: No  MENTAL HEALTH SYMPTOMS: No  Heart burn or indigestion: No  Nausea: No  Vomiting: No  Abdominal pain: No  Bloating: No  Constipation: No  Diarrhea: No  Blood in stool: No  Black stools: No  Rectal or Anal pain: No  Fecal incontinence: No  Rectal bleeding: No  Yellowing of skin or eyes: No  Vomit with blood: No  Change in stools: No  Hemorrhoids: No  Trouble holding urine or incontinence: Yes  Pain or burning: Yes  Trouble starting or stopping: Yes  Increased frequency of urination: Yes  Blood in urine: No  Decreased frequency of urination: No  Frequent nighttime urination: No  Flank pain: Yes  Difficulty emptying bladder: Yes

## 2017-01-18 ENCOUNTER — CARE COORDINATION (OUTPATIENT)
Dept: ONCOLOGY | Facility: CLINIC | Age: 67
End: 2017-01-18

## 2017-01-18 NOTE — PROGRESS NOTES
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 Marcus Dougherty at [facility])  -Recommended to taper off steroids by 10/7/13  -Follow up with Dr. Dougherty on 11/5/13    10/28/13--MRI shows some contrast enhancement both laterally and medially from known mass (expected)  -Start adjuvant Temodar and repeat MRI in 2 months    11/4/13--Started adjuvant Temodar    1/6/14--MRI stable (enhancement decreased); continue Temodar and repeat in 2 months  -Offered Mirapex for restless legs (actually more of a periodic leg movement) but  patient declined for now    3/3/14--MRI stable; continue Temodar and repeat in 2 months     5/12/14--MRI stable; continue Temodar and repeat in 2 months    5/27/14--Patient and wife report that he has been  down  lately; recommend starting Celexa 20 mg daily    6/25/14--Started on Trazodone 50 mg at bedtime due to difficulty falling asleep and staying asleep; patient reports improvement in sleep    7/14/14--MRI stable; continue Temodar and repeat in 2 months     9/19/14--Completed 12 cycles of Temodar    9/29/14--MRI stable repeat in 3 months     1/5/15--MRI stable; repeat in 3 months    Klonopin 0.25-0.5mg started for 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

## 2017-05-12 ENCOUNTER — HOSPITAL ENCOUNTER (OUTPATIENT)
Dept: MRI IMAGING | Facility: CLINIC | Age: 67
Discharge: HOME | End: 2017-05-12
Attending: NEUROLOGICAL SURGERY | Admitting: NEUROLOGICAL SURGERY
Payer: MEDICARE

## 2017-05-12 DIAGNOSIS — C71.9 OLIGODENDROGLIOMA, ANAPLASTIC (H): ICD-10-CM

## 2017-05-12 PROCEDURE — 70553 MRI BRAIN STEM W/O & W/DYE: CPT

## 2017-05-12 PROCEDURE — 25500064 ZZH RX 255 OP 636: Performed by: NEUROLOGICAL SURGERY

## 2017-05-12 PROCEDURE — A9585 GADOBUTROL INJECTION: HCPCS | Performed by: NEUROLOGICAL SURGERY

## 2017-05-12 RX ORDER — GADOBUTROL 604.72 MG/ML
15 INJECTION INTRAVENOUS ONCE
Status: COMPLETED | OUTPATIENT
Start: 2017-05-12 | End: 2017-05-12

## 2017-05-12 RX ADMIN — GADOBUTROL 13 ML: 604.72 INJECTION INTRAVENOUS at 09:35

## 2017-05-17 ASSESSMENT — ENCOUNTER SYMPTOMS
SMELL DISTURBANCE: 0
EYE REDNESS: 0
EYE IRRITATION: 1
HOARSE VOICE: 0
NECK MASS: 0
EYE PAIN: 0
TASTE DISTURBANCE: 0
EYE WATERING: 1
SORE THROAT: 0
SINUS PAIN: 0
TROUBLE SWALLOWING: 0
DOUBLE VISION: 0
SINUS CONGESTION: 1

## 2017-05-19 ENCOUNTER — ONCOLOGY VISIT (OUTPATIENT)
Dept: ONCOLOGY | Facility: CLINIC | Age: 67
End: 2017-05-19
Attending: PSYCHIATRY & NEUROLOGY
Payer: MEDICARE

## 2017-05-19 VITALS
BODY MASS INDEX: 38.88 KG/M2 | OXYGEN SATURATION: 94 % | WEIGHT: 277.7 LBS | SYSTOLIC BLOOD PRESSURE: 138 MMHG | RESPIRATION RATE: 16 BRPM | HEART RATE: 76 BPM | TEMPERATURE: 98.6 F | HEIGHT: 71 IN | DIASTOLIC BLOOD PRESSURE: 82 MMHG

## 2017-05-19 DIAGNOSIS — C71.9 OLIGODENDROGLIOMA, ANAPLASTIC (H): Primary | ICD-10-CM

## 2017-05-19 DIAGNOSIS — F19.982 DRUG INDUCED INSOMNIA (H): ICD-10-CM

## 2017-05-19 DIAGNOSIS — G40.909 SEIZURE DISORDER (H): ICD-10-CM

## 2017-05-19 PROCEDURE — 99213 OFFICE O/P EST LOW 20 MIN: CPT | Mod: ZF

## 2017-05-19 PROCEDURE — 99204 OFFICE O/P NEW MOD 45 MIN: CPT | Mod: ZP | Performed by: PSYCHIATRY & NEUROLOGY

## 2017-05-19 ASSESSMENT — PAIN SCALES - GENERAL: PAINLEVEL: NO PAIN (0)

## 2017-05-19 NOTE — PATIENT INSTRUCTIONS
I will look into neuro-oncologists in your area;  -[facility]     Will include in our note our discussion on decreasing Keppra.     Repeat MR brain imaging in 3-4 months.     Dominga Lobato MD  Neuro-oncology  5/19/2017

## 2017-05-19 NOTE — LETTER
5/19/2017       RE: Jimmy Walter  [address] [facility] [id number]     Dear Colleague,    Thank you for referring your patient, Jimmy Walter, to the [facility]. Please see a copy of my visit note below.    NEURO-ONCOLOGY - NEW PATIENT VISIT    NAME: Jimmy Walter KYM: May 19, 2017   MRN: [id number]      REFERRING PHYSICIAN: Danilo Antonio         Chief complaint:   Anaplastic oligodendroglioma (1p-19q co-deletion)         History of Present Illness:   Mr. Jimmy Walter is a 66 year old male with a history of anaplastic oligodendroglioma who is presenting to this initial consultation for evaluation and recommendations on treatment. He is accompanied by his wife, Tania.     His oncology history is as follow:  7/2/2013: Presented with seizure-like episode x 2, headaches. MRI shows contrast-enhancing right frontal lesion with minimal edema on T2  7/10/2013 SURGERY: Stealth-guided right frontal craniotomy for tumor resection by Dr. Nam Antonio, neurosurgery at the [facility].  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  8/12/13-9/26/2013 CHEMORADS: Radiation and concurrent temozolomide (5940 cGy; Dr. Marcus Dougherty at [facility]).  11/4/2013-9/19/2014 CHEMO: Completed 12 cycles of temozolomide. He was following with Dr. Banda  He has no evidence of recurrence since his surgey on imaging.  5/12/2017 MR brain imaging stable with no evidence of disease.    Jimmy is planning to permanently move to [place], [place] or [place], [place] to be closer to family/ grandchildren. Today, he is inquiring about physicians that he can continue his care with in these cities. Denies any new neurological symptoms, denies headaches. Denies recurrent seizure events, driving. Complaints of continued sleep disturbances, but insomnia/ sleep quality is improved, able to better tolerate his CPAP. Doing very well and excited for his move.           Review of Systems:   ROS: Comprehensive ROS negative except for that detailed above.         Past Medical History:     Past Medical History:   Diagnosis Date     Allergy      Cancer (H)      Seizures (H)      Sleep apnea     CPAP          Past Surgical History:     Past Surgical History:   Procedure Laterality Date     COMBINED CYSTOSCOPY, RETROGRADES, EXCHANGE STENT URETER(S) Right 1/3/2017    Procedure: COMBINED CYSTOSCOPY, RETROGRADES, EXCHANGE STENT URETER(S);  Surgeon: Milan Leary MD;  [facility]     COMBINED CYSTOSCOPY, RETROGRADES, URETEROSCOPY, INSERT STENT Right 12/17/2016    Procedure: COMBINED CYSTOSCOPY, RETROGRADES, URETEROSCOPY, INSERT STENT;  Surgeon: Milan Leary MD;  Location: [facility] OR     EXTRACORPOREAL SHOCK WAVE LITHOTRIPSY (ESWL) Right 1/3/2017    Procedure: EXTRACORPOREAL SHOCK WAVE LITHOTRIPSY (ESWL);  Surgeon: Mlian Leary MD;  Location: [facility] OR     LASER HOLMIUM LITHOTRIPSY URETER(S), INSERT STENT, COMBINED Right 1/3/2017    Procedure: COMBINED CYSTOSCOPY, URETEROSCOPY, LASER HOLMIUM LITHOTRIPSY URETER(S), INSERT STENT;  Surgeon: Milan Leary MD;  Location:  OR     MRI CRANIOTOMY WITH OPTICAL TRACKING SYSTEM  7/10/2013    Procedure: MRI CRANIOTOMY WITH OPTICAL TRACKING SYSTEM;  MRI 3 Stealth Assisted Right Craniotomy And Tumor Resection ;  Surgeon: Danilo Antonio MD;  [facility]     ORTHOPEDIC SURGERY      toe surgery     ORTHOPEDIC SURGERY Left     ORIF Fibula FX          Social History:     Social History     Marital status:      Social History Main Topics     Smoking status: Never Smoker          Alcohol use Yes      Comment: one per week at most     Drug use: No     Sexual activity: Yes     Partners: Female          Family History:   No family history of cancer.          Allergies:     Allergies   Allergen Reactions     Alfuzosin      Other reaction(s): Edema,generalized     Sulfa Drugs Itching and Swelling     Septra      "Sulfamethoxazole-Trimethoprim      Other reaction(s): Edema,generalized          Home Medications:     Current Outpatient Prescriptions   Medication     ASPIRIN PO     loratadine (CLARITIN) 10 MG tablet     traZODone (DESYREL) 50 MG tablet     levETIRAcetam (KEPPRA) 750 MG tablet     MELATONIN PO     magnesium 250 MG tablet     DiphenhydrAMINE HCl (BENADRYL ALLERGY PO)     Ascorbic Acid (VITAMIN C ER PO)     omeprazole 20 MG tablet     fluticasone (FLONASE) 50 MCG/ACT nasal spray     multivitamin, therapeutic with minerals (THERA-VIT-M) TABS     Omega-3 Fatty Acids (OMEGA-3 FISH OIL PO)     calcium carbonate (OS-GABRIEL 500 MG [place]. CA) 500 MG tablet     No current facility-administered medications for this visit.             Physical Exam:     /82 (BP Location: Left arm, Patient Position: Chair, Cuff Size: Adult Regular)  Pulse 76  Temp 98.6  F (37  C) (Oral)  Resp 16  Ht 1.803 m (5' 10.98\")  Wt 126 kg (277 lb 11.2 oz)  SpO2 94%  BMI 38.75 kg/m2  Weight: 126 kg (277 lb 11.2 oz)     ECO  General:  no acute distress.  Heme/Lymph: No overt bleeding. No cervical, axillary, or inguinal  lymph adenopathy.  Skin: No concerning lesions or rash on exposed surfaces.  HEENT: NCAT. anicteric sclera. Oral mucosa pink and moist with no lesions or thrush.  Neck: Neck supple. No jugular venous distention.  Respiratory: Non-labored breathing, good air exchange, lungs clear to auscultation bilaterally.  Cardiovascular: Regular rate and rhythm. No murmur or rub.   Abdomen: Normoactive bowel sounds. Abdomen soft, non-distended, and non-tender. No palpable masses or organomegaly.  Extremities: grossly normal, non-tender, no edema. Good strength and ROM.  Psychiatric: Mentation and affect appear normal.  Neuro Exam  Orientation:   Á+O x 3 (person, time, place)  Cranial nerves  CN-2: pupillary reflex (PERRL normal), visual fields intact  CN-3/4/6: EOMI  CN-5: Facial sensation intact to light touch/pinprick bilaterally in all " 3 divisions.  CN-7: Facial movement symmetrical (smile, brow raise, buries eyelashes, puffs cheeks)  CN-8: hearing intact to finger rub bilaterally  CN-9/10/12: tongue, uvula and pharynx midline, gag reflex  CN-11: shoulder & neck have full ROM, SCM & trapezius have symmetrical movements with normal strength   Motor:  Strength: 5/5 in all extremitas.   Tone:  normal  Reflexes:  Biceps +2 Triceps +2 Brachioradialis +2 Patellar +2 Achilles +2  Sensory:  Sensation intact to: soft touch in all extremites.  Cerebellar function:  Finger-to-nose test: normal   Heel-to-shin test: normal   Fast finger movements: normal   Rapid alternating movements: normall  Gait:  normal         Data:          Labs:   No recent lab results to review in the system.           Images:   Personally reviewed MR brain imaging from today and compared to post-radiation imaging. To my eye, the T2 FLAIR signal abnormality about the right frontal surgical cavity is stable with no new contrast enhancement. Overall, no evidence of disease recurrence.     Imaging was shown to and results were reviewed with Yanelis.     Imaging and case reviewed and discussed at Brain Tumor Conference.            Pathology:     Pathology 7/10/13  FINAL DIAGNOSIS:  Cerebrum, right frontal lobe, mass, excisional biopsy       -  Anaplastic oligodendroglioma (WHO grade III) (see Comment).    COMMENT:  1p/19q co-deletion fluorescent in-situ hybridization (FISH) results will  be issued in a separate report.    FISH 7/10/13  INTERPRETATION:  Deletion of both the 1p36 and 19q13 regions was detected by FISH. Thus,  these findings are consistent with the presence of the 1p/19q  co-deletion found in the majority of oligodendrogliomas that has been  associated with a favorable response to chemotherapy and radiotherapy.  The signal patterns seen in this study would be consistent with the  presence of a near-tetraploid karyotype.           Assessment and Plan:   1-WHO grade III  (anaplastic) oligodendroglioma with 1p/19q co-deletions  S/p gross total resection on 7/10/13 then treated with concurrent chemoradiation with temozolomide that was followed by 12 cycles of adjuvant temozolomide, completed on 9/19/14. Clinically he remains stable and radiographically, there has been no evidence of disease recurrence since completing therapy.     Jimmy and Tania will be moving to [place], [place] or [place], [place] to be closer to family/ grandchildren and will be looking to establish care with a neuro-oncologists likely at [place] or [place]. Jimmy is very aware that there is no cure for his brain tumor and that while imaging has remained stable for almost 3 years, he understands that there is a good chance that the tumor will recur at some point. As a result, he knows the importance of continued follow-up care.    Recommending repeat imaging in 3-4 months.     2- Seizure-like activity on presentation  This event was described as total body stiffness, jerking, and loss of awareness. Jimmy is currently on Keppra 750 mg BID, which is causing him to feel tired. He has had no recurrent seizures since 7/2013. He may be able to go down to Keppra 500 mg BID, but it is my recommendation that he have restricted driving privileges of 4-6 weeks while the dose is being reduced. With the upcoming move, Jimmy needs to be able to drive, so he will follow-up on this issue with his next neuro-oncologist.    It was a pleasure meeting Jimmy and Tania for the first and only time today. I wished them luck on their move and offered my assistance as needed in helping him transfer his care.     Patient was also seen and examined by Dr. Isabella Gallegos, Hem/Onc Fellow.  Dominga Lobato MD  Neuro-oncology

## 2017-05-19 NOTE — MR AVS SNAPSHOT
After Visit Summary   5/19/2017    Jimmy Walter    MRN: [id number]           Patient Information     Date Of Birth          1950        Visit Information        Provider Department      5/19/2017 10:30 AM Dominga Lobato MD [facility]        Care Instructions    I will look into neuro-oncologists in your area;  -[facility]     Will include in our note our discussion on decreasing Keppra.     Repeat MR brain imaging in 3-4 months.     Dominga Lobato MD  Neuro-oncology  5/19/2017            Follow-ups after your visit        Who to contact     If you have questions or need follow up information about today's clinic visit or your schedule please contact [facility] directly at [phone].  Normal or non-critical lab and imaging results will be communicated to you by MyChart, letter or phone within 4 business days after the clinic has received the results. If you do not hear from us within 7 days, please contact the clinic through MyChart or phone. If you have a critical or abnormal lab result, we will notify you by phone as soon as possible.  Submit refill requests through [email or web address] or call your pharmacy and they will forward the refill request to us. Please allow 3 business days for your refill to be completed.          Additional Information About Your Visit        MyChart Information     [email or web address] gives you secure access to your electronic health record. If you see a primary care provider, you can also send messages to your care team and make appointments. If you have questions, please call your primary care clinic.  If you do not have a primary care provider, please call [phone] and they will assist you.        Care EveryWhere ID     This is your Care EveryWhere ID. This could be used by other organizations to access your [place] medical records  [id number]        Your Vitals Were     Pulse Temperature Respirations Height Pulse  "Oximetry BMI (Body Mass Index)    76 98.6  F (37  C) (Oral) 16 1.803 m (5' 10.98\") 94% 38.75 kg/m2       Blood Pressure from Last 3 Encounters:   05/19/17 138/82   01/16/17 143/86   01/03/17 151/84    Weight from Last 3 Encounters:   05/19/17 126 kg (277 lb 11.2 oz)   01/16/17 123.1 kg (271 lb 4.8 oz)   01/12/17 122.5 kg (270 lb)              Today, you had the following     No orders found for display       Primary Care Provider Office Phone # Fax #    Kevyn Ahumadatracey [phone]       [facility] [address] [place] [address]        Thank you!     Thank you for choosing [facility]  for your care. Our goal is always to provide you with excellent care. Hearing back from our patients is one way we can continue to improve our services. Please take a few minutes to complete the written survey that you may receive in the mail after your visit with us. Thank you!             Your Updated Medication List - Protect others around you: Learn how to safely use, store and throw away your medicines at www.disposemymeds.org.          This list is accurate as of: 5/19/17 11:16 AM.  Always use your most recent med list.                   Brand Name Dispense Instructions for use    ASPIRIN PO      Take 325 mg by mouth daily       BENADRYL ALLERGY PO      Take 25 mg by mouth daily       calcium carbonate 500 MG tablet    OS-GABRIEL 500 mg [place]. Ca     Take 500 mg by mouth daily       fluticasone 50 MCG/ACT spray    FLONASE     Spray 1 spray into both nostrils daily.       levETIRAcetam 750 MG tablet    KEPPRA    60 tablet    Take 1 tablet (750 mg) by mouth 2 times daily       loratadine 10 MG tablet    CLARITIN     Take 10 mg by mouth daily       magnesium 250 MG tablet      Take 1 tablet by mouth daily       MELATONIN PO      Take 5 mg by mouth At Bedtime       multivitamin, therapeutic with minerals Tabs tablet      Take 1 tablet by mouth daily.       OMEGA-3 FISH OIL PO      Take 1 g " by mouth daily       omeprazole 20 MG tablet     30 tablet    Take 1 tablet by mouth daily. Take 30-60 minutes before a meal.       traZODone 50 MG tablet    DESYREL    60 tablet    Take 1 tablet (50 mg) by mouth nightly as needed for sleep       VITAMIN C ER PO      Take 600 mg by mouth daily

## 2017-05-19 NOTE — PROGRESS NOTES
NEURO-ONCOLOGY - NEW PATIENT VISIT    NAME: Jimmy Walter KYM: May 19, 2017   MRN: [id number]      REFERRING PHYSICIAN: Danilo Antonio         Chief complaint:   Anaplastic oligodendroglioma (1p-19q co-deletion)         History of Present Illness:   Mr. Jimmy Walter is a 66 year old male with a history of anaplastic oligodendroglioma who is presenting to this initial consultation for evaluation and recommendations on treatment. He is accompanied by his wife, Tania.     His oncology history is as follow:  7/2/2013: Presented with seizure-like episode x 2, headaches. MRI shows contrast-enhancing right frontal lesion with minimal edema on T2  7/10/2013 SURGERY: Stealth-guided right frontal craniotomy for tumor resection by Dr. Nam Antonio, neurosurgery at the [facility].  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  8/12/13-9/26/2013 CHEMORADS: Radiation and concurrent temozolomide (5940 cGy; Dr. Marcus Dougherty at [facility]).  11/4/2013-9/19/2014 CHEMO: Completed 12 cycles of temozolomide. He was following with Dr. Banda  He has no evidence of recurrence since his surgey on imaging.  5/12/2017 MR brain imaging stable with no evidence of disease.    Jimmy is planning to permanently move to [place], [place] or [place], [place] to be closer to family/ grandchildren. Today, he is inquiring about physicians that he can continue his care with in these cities. Denies any new neurological symptoms, denies headaches. Denies recurrent seizure events, driving. Complaints of continued sleep disturbances, but insomnia/ sleep quality is improved, able to better tolerate his CPAP. Doing very well and excited for his move.          Review of Systems:   ROS: Comprehensive ROS negative except for that detailed above.         Past Medical History:     Past Medical History:   Diagnosis Date     Allergy      Cancer (H)      Seizures (H)      Sleep apnea     CPAP          Past  Surgical History:     Past Surgical History:   Procedure Laterality Date     COMBINED CYSTOSCOPY, RETROGRADES, EXCHANGE STENT URETER(S) Right 1/3/2017    Procedure: COMBINED CYSTOSCOPY, RETROGRADES, EXCHANGE STENT URETER(S);  Surgeon: Milan Leary MD;  [facility] OR     COMBINED CYSTOSCOPY, RETROGRADES, URETEROSCOPY, INSERT STENT Right 12/17/2016    Procedure: COMBINED CYSTOSCOPY, RETROGRADES, URETEROSCOPY, INSERT STENT;  Surgeon: Milan Leary MD;  Location: [facility] OR     EXTRACORPOREAL SHOCK WAVE LITHOTRIPSY (ESWL) Right 1/3/2017    Procedure: EXTRACORPOREAL SHOCK WAVE LITHOTRIPSY (ESWL);  Surgeon: Milan Leary MD;  Location[facility] OR     LASER HOLMIUM LITHOTRIPSY URETER(S), INSERT STENT, COMBINED Right 1/3/2017    Procedure: COMBINED CYSTOSCOPY, URETEROSCOPY, LASER HOLMIUM LITHOTRIPSY URETER(S), INSERT STENT;  Surgeon: Milan Leary MD;  Location:  OR     MRI CRANIOTOMY WITH OPTICAL TRACKING SYSTEM  7/10/2013    Procedure: MRI CRANIOTOMY WITH OPTICAL TRACKING SYSTEM;  MRI 3 Stealth Assisted Right Craniotomy And Tumor Resection ;  Surgeon: Danilo Antonio MD;  [facility]     ORTHOPEDIC SURGERY      toe surgery     ORTHOPEDIC SURGERY Left     ORIF Fibula FX          Social History:     Social History     Marital status:      Social History Main Topics     Smoking status: Never Smoker          Alcohol use Yes      Comment: one per week at most     Drug use: No     Sexual activity: Yes     Partners: Female          Family History:   No family history of cancer.          Allergies:     Allergies   Allergen Reactions     Alfuzosin      Other reaction(s): Edema,generalized     Sulfa Drugs Itching and Swelling     Septra     Sulfamethoxazole-Trimethoprim      Other reaction(s): Edema,generalized          Home Medications:     Current Outpatient Prescriptions   Medication     ASPIRIN PO     loratadine (CLARITIN) 10 MG tablet     traZODone (DESYREL) 50 MG tablet      "levETIRAcetam (KEPPRA) 750 MG tablet     MELATONIN PO     magnesium 250 MG tablet     DiphenhydrAMINE HCl (BENADRYL ALLERGY PO)     Ascorbic Acid (VITAMIN C ER PO)     omeprazole 20 MG tablet     fluticasone (FLONASE) 50 MCG/ACT nasal spray     multivitamin, therapeutic with minerals (THERA-VIT-M) TABS     Omega-3 Fatty Acids (OMEGA-3 FISH OIL PO)     calcium carbonate (OS-GABRIEL 500 MG [place]. CA) 500 MG tablet     No current facility-administered medications for this visit.             Physical Exam:     /82 (BP Location: Left arm, Patient Position: Chair, Cuff Size: Adult Regular)  Pulse 76  Temp 98.6  F (37  C) (Oral)  Resp 16  Ht 1.803 m (5' 10.98\")  Wt 126 kg (277 lb 11.2 oz)  SpO2 94%  BMI 38.75 kg/m2  Weight: 126 kg (277 lb 11.2 oz)     ECO  General:  no acute distress.  Heme/Lymph: No overt bleeding. No cervical, axillary, or inguinal  lymph adenopathy.  Skin: No concerning lesions or rash on exposed surfaces.  HEENT: NCAT. anicteric sclera. Oral mucosa pink and moist with no lesions or thrush.  Neck: Neck supple. No jugular venous distention.  Respiratory: Non-labored breathing, good air exchange, lungs clear to auscultation bilaterally.  Cardiovascular: Regular rate and rhythm. No murmur or rub.   Abdomen: Normoactive bowel sounds. Abdomen soft, non-distended, and non-tender. No palpable masses or organomegaly.  Extremities: grossly normal, non-tender, no edema. Good strength and ROM.  Psychiatric: Mentation and affect appear normal.  Neuro Exam  Orientation:   Á+O x 3 (person, time, place)  Cranial nerves  CN-2: pupillary reflex (PERRL normal), visual fields intact  CN-3/4/6: EOMI  CN-5: Facial sensation intact to light touch/pinprick bilaterally in all 3 divisions.  CN-7: Facial movement symmetrical (smile, brow raise, buries eyelashes, puffs cheeks)  CN-8: hearing intact to finger rub bilaterally  CN-9/10/12: tongue, uvula and pharynx midline, gag reflex  CN-11: shoulder & neck have full " ROM, SCM & trapezius have symmetrical movements with normal strength   Motor:  Strength: 5/5 in all extremitas.   Tone:  normal  Reflexes:  Biceps +2 Triceps +2 Brachioradialis +2 Patellar +2 Achilles +2  Sensory:  Sensation intact to: soft touch in all extremites.  Cerebellar function:  Finger-to-nose test: normal   Heel-to-shin test: normal   Fast finger movements: normal   Rapid alternating movements: normall  Gait:  normal         Data:          Labs:   No recent lab results to review in the system.           Images:   Personally reviewed MR brain imaging from today and compared to post-radiation imaging. To my eye, the T2 FLAIR signal abnormality about the right frontal surgical cavity is stable with no new contrast enhancement. Overall, no evidence of disease recurrence.     Imaging was shown to and results were reviewed with Yanelis.     Imaging and case reviewed and discussed at Brain Tumor Conference.            Pathology:     Pathology 7/10/13  FINAL DIAGNOSIS:  Cerebrum, right frontal lobe, mass, excisional biopsy       -  Anaplastic oligodendroglioma (WHO grade III) (see Comment).    COMMENT:  1p/19q co-deletion fluorescent in-situ hybridization (FISH) results will  be issued in a separate report.    FISH 7/10/13  INTERPRETATION:  Deletion of both the 1p36 and 19q13 regions was detected by FISH. Thus,  these findings are consistent with the presence of the 1p/19q  co-deletion found in the majority of oligodendrogliomas that has been  associated with a favorable response to chemotherapy and radiotherapy.  The signal patterns seen in this study would be consistent with the  presence of a near-tetraploid karyotype.           Assessment and Plan:   1-WHO grade III (anaplastic) oligodendroglioma with 1p/19q co-deletions  S/p gross total resection on 7/10/13 then treated with concurrent chemoradiation with temozolomide that was followed by 12 cycles of adjuvant temozolomide, completed on 9/19/14.  Clinically he remains stable and radiographically, there has been no evidence of disease recurrence since completing therapy.     Jimmy and Tania will be moving to [place], [place] or [place], [place] to be closer to family/ grandchildren and will be looking to establish care with a neuro-oncologists likely at [place] or [place]. Jimmy is very aware that there is no cure for his brain tumor and that while imaging has remained stable for almost 3 years, he understands that there is a good chance that the tumor will recur at some point. As a result, he knows the importance of continued follow-up care.    Recommending repeat imaging in 3-4 months.     2- Seizure-like activity on presentation  This event was described as total body stiffness, jerking, and loss of awareness. Jimmy is currently on Keppra 750 mg BID, which is causing him to feel tired. He has had no recurrent seizures since 7/2013. He may be able to go down to Keppra 500 mg BID, but it is my recommendation that he have restricted driving privileges of 4-6 weeks while the dose is being reduced. With the upcoming move, Jimmy needs to be able to drive, so he will follow-up on this issue with his next neuro-oncologist.    It was a pleasure meeting Jimmy and Tania for the first and only time today. I wished them luck on their move and offered my assistance as needed in helping him transfer his care.     Patient was also seen and examined by Dr. Isabella Gallegos, Hem/Onc Fellow.  Dominga Lobato MD  Neuro-oncology

## 2017-05-19 NOTE — NURSING NOTE
"Oncology Rooming Note    May 19, 2017 10:29 AM   Jimmy Walter is a 66 year old male who presents for:    Chief Complaint   Patient presents with     Oncology Clinic Visit     new- Anaplastic      Initial Vitals: /82 (BP Location: Left arm, Patient Position: Chair, Cuff Size: Adult Regular)  Pulse 76  Temp 98.6  F (37  C) (Oral)  Resp 16  Ht 1.803 m (5' 10.98\")  Wt 126 kg (277 lb 11.2 oz)  SpO2 94%  BMI 38.75 kg/m2 Estimated body mass index is 38.75 kg/(m^2) as calculated from the following:    Height as of this encounter: 1.803 m (5' 10.98\").    Weight as of this encounter: 126 kg (277 lb 11.2 oz). Body surface area is 2.51 meters squared.  No Pain (0) Comment: Data Unavailable   No LMP for male patient.  Allergies reviewed: Yes  Medications reviewed: Yes    Medications: Medication refills not needed today.  Pharmacy name entered into [employer]:    [facility] - [place], [place] - [address] [id number] - [place], [place] - [address]  [facility] - [place], [place] - [address]    Clinical concerns: Patient has imaging to review    10 minutes for nursing intake (face to face time)     Jennie Monson LPN            "

## 2017-05-26 ENCOUNTER — TELEPHONE (OUTPATIENT)
Dept: ONCOLOGY | Facility: CLINIC | Age: 67
End: 2017-05-26

## 2017-05-26 DIAGNOSIS — G47.00 INSOMNIA: ICD-10-CM

## 2017-05-26 RX ORDER — TRAZODONE HYDROCHLORIDE 50 MG/1
50 TABLET, FILM COATED ORAL
Qty: 30 TABLET | Refills: 0 | Status: SHIPPED | OUTPATIENT
Start: 2017-05-26

## 2017-05-26 NOTE — TELEPHONE ENCOUNTER
----- Message from Dominga Lobato MD sent at 5/25/2017 11:15 AM CDT -----  Regarding: RE: refill of trazodone  Sure, that is fine.   If I have to sign a hard script, I can do so in clinic tomorrow.   Thanks,  Dominga     ----- Message -----     From: Alisha Mullen LPN     Sent: 5/25/2017  11:05 AM       To: Dominga Lobato MD  Subject: refill of trazodone                              Would you be willing to approve a 30 day supply of trazodone 50mg for this gentleman to enable him to have medication until he establishes care in [place]?

## 2017-06-01 ENCOUNTER — CARE COORDINATION (OUTPATIENT)
Dept: ONCOLOGY | Facility: CLINIC | Age: 67
End: 2017-06-01
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
## 2017-06-02 NOTE — PROGRESS NOTES
Reason for Outgoing Call:   Reply from Dr. Lobato:  Nothing special to test/ image from a neuro-oncology standpoint.   I am ok with the chiropractor, just that quick neck manipulations are NEVER ok for anyone. Gentle stretching and massage is fine.   Nursing Action/Patient Instruction:  Notified pt of Dr. Lobato's recommendations above  Patient Response/Evaluation:   Pt voiced understanding and appreciation of above instructions and information and denied further questions

## 2017-08-14 ENCOUNTER — MYC MEDICAL ADVICE (OUTPATIENT)
Dept: ONCOLOGY | Facility: CLINIC | Age: 67
End: 2017-08-14

## 2019-09-30 ENCOUNTER — HEALTH MAINTENANCE LETTER (OUTPATIENT)
Age: 69
End: 2019-09-30

## 2020-03-15 ENCOUNTER — HEALTH MAINTENANCE LETTER (OUTPATIENT)
Age: 70
End: 2020-03-15

## 2021-01-15 ENCOUNTER — HEALTH MAINTENANCE LETTER (OUTPATIENT)
Age: 71
End: 2021-01-15

## 2021-05-09 ENCOUNTER — HEALTH MAINTENANCE LETTER (OUTPATIENT)
Age: 71
End: 2021-05-09

## 2021-10-24 ENCOUNTER — HEALTH MAINTENANCE LETTER (OUTPATIENT)
Age: 71
End: 2021-10-24

## 2022-06-05 ENCOUNTER — HEALTH MAINTENANCE LETTER (OUTPATIENT)
Age: 72
End: 2022-06-05

## 2022-10-15 ENCOUNTER — HEALTH MAINTENANCE LETTER (OUTPATIENT)
Age: 72
End: 2022-10-15

## 2023-06-11 ENCOUNTER — HEALTH MAINTENANCE LETTER (OUTPATIENT)
Age: 73
End: 2023-06-11